# Patient Record
Sex: MALE | Race: WHITE | NOT HISPANIC OR LATINO | Employment: UNEMPLOYED | URBAN - METROPOLITAN AREA
[De-identification: names, ages, dates, MRNs, and addresses within clinical notes are randomized per-mention and may not be internally consistent; named-entity substitution may affect disease eponyms.]

---
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
## 2020-05-19 ENCOUNTER — APPOINTMENT (OUTPATIENT)
Dept: RADIOLOGY | Facility: MEDICAL CENTER | Age: 20
End: 2020-05-19
Attending: EMERGENCY MEDICINE
Payer: COMMERCIAL

## 2020-05-19 ENCOUNTER — HOSPITAL ENCOUNTER (EMERGENCY)
Facility: MEDICAL CENTER | Age: 20
End: 2020-05-19
Attending: EMERGENCY MEDICINE
Payer: COMMERCIAL

## 2020-05-19 VITALS
HEART RATE: 84 BPM | OXYGEN SATURATION: 98 % | SYSTOLIC BLOOD PRESSURE: 142 MMHG | RESPIRATION RATE: 16 BRPM | DIASTOLIC BLOOD PRESSURE: 78 MMHG | HEIGHT: 64 IN | WEIGHT: 117.5 LBS | BODY MASS INDEX: 20.06 KG/M2 | TEMPERATURE: 97 F

## 2020-05-19 DIAGNOSIS — R04.2 BLOOD IN SPUTUM: ICD-10-CM

## 2020-05-19 PROCEDURE — 99283 EMERGENCY DEPT VISIT LOW MDM: CPT

## 2020-05-19 PROCEDURE — 71046 X-RAY EXAM CHEST 2 VIEWS: CPT

## 2020-05-19 ASSESSMENT — FIBROSIS 4 INDEX: FIB4 SCORE: 0.32

## 2020-05-19 NOTE — ED NOTES
Reviewed DC instructions with pt. Pt verbalized understanding.Pt ambulatory with steady gait to lobby be transported home by mother. VSS on room air. All questions and concerns addressed with pt prior to DC.

## 2020-05-19 NOTE — ED TRIAGE NOTES
"Chief Complaint   Patient presents with   • Blood in Sputum     Pt states he woke up feeling mucus in the back of his throat and coughed up BRB. States he can taste the blood and is continually spitting.     BP (!) 167/104   Pulse (!) 109   Temp 36.6 °C (97.9 °F) (Temporal)   Resp 16   Ht 1.626 m (5' 4\")   Wt 53.3 kg (117 lb 8.1 oz)   SpO2 96%   BMI 20.17 kg/m²     Pt ambulated into triage, mask in place. Pt denies any other symptoms. Spit is clear in triage. VS as above, NAD, encouraged to return to the triage nurse or tech with any new complaints or symptoms.  "

## 2020-05-19 NOTE — ED PROVIDER NOTES
ED Provider Note    ED Provider Note    Primary care provider: Pcp Pt States None  Means of arrival: Walk-in  History obtained from: Patient    CHIEF COMPLAINT  Chief Complaint   Patient presents with   • Blood in Sputum     Pt states he woke up feeling mucus in the back of his throat and coughed up BRB. States he can taste the blood and is continually spitting.     Seen at 9:22 AM.   HPI  Compawing Dan is a 19 y.o. male who presents to the Emergency Department for spitting up blood.  The patient awoke this morning and spit up multiple bright red droplets of blood.  This has decreased in volume and now he notes a minor amount of brownish discharge when he spits.  He denies any recent nosebleed.  He denies any recent cough, he adamantly denies any hemoptysis.    He does note a rash that showed up on his right antecubital fossa.  He had some mild amount of nausea this morning.  Other than this he is without complaints.    He denies any headache, neck pain, epistaxis, cough, hemoptysis, chest pain, shortness of breath, lightheadedness, bleeding diathesis, impaired immunity, night sweats, weight loss.    REVIEW OF SYSTEMS  See HPI,   Remainder of ROS negative.     PAST MEDICAL HISTORY   has a past medical history of Allergy.    SURGICAL HISTORY  patient denies any surgical history    SOCIAL HISTORY  Social History     Tobacco Use   • Smoking status: Never Smoker   • Smokeless tobacco: Never Used   Substance Use Topics   • Alcohol use: No   • Drug use: No      Social History     Substance and Sexual Activity   Drug Use No       FAMILY HISTORY  Family History   Problem Relation Age of Onset   • Non-contributory Mother    • Thyroid Mother    • Non-contributory Father    • Heart Disease Father    • Other Father         hypercholesterolemia   • Hypertension Father    • Other Sister         migraine   • Cancer Maternal Grandfather         lung   • Heart Disease Maternal Grandfather    • Heart Disease Paternal  "Grandfather        CURRENT MEDICATIONS  Reviewed.  See Encounter Summary.     ALLERGIES  Allergies   Allergen Reactions   • Amoxicillin Hives       PHYSICAL EXAM  VITAL SIGNS: /78   Pulse 84   Temp 36.1 °C (97 °F) (Temporal)   Resp 16   Ht 1.626 m (5' 4\")   Wt 53.3 kg (117 lb 8.1 oz)   SpO2 98%   BMI 20.17 kg/m²   Constitutional: Awake, alert in no apparent distress.  HENT: Normocephalic, Bilateral external ears normal. Nose normal.  Bilateral nares and oropharynx within normal limits without any obvious source of bleeding.  Eyes: Conjunctiva normal, non-icteric, EOMI.    Thorax & Lungs: Easy unlabored respirations, Clear to ascultation bilaterally.  Cardiovascular: Regular rate, Regular rhythm, No murmurs, rubs or gallops.  Borderline tachycardic, heart rate returns to normal with rest.  Abdomen:  Soft, nontender, nondistended, normal active bowel sounds.   :    Skin: Visualized skin is  Dry, no petechiae, no ecchymosis, nonspecific mild redness in the right antecubital fossa.  Musculoskeletal:   No cyanosis, clubbing or edema.  Neurologic: Alert, Grossly non-focal.   Psychiatric: Normal affect, Normal mood  Lymphatic:  No cervical LAD      RADIOLOGY  DX-CHEST-2 VIEWS   Final Result      No acute cardiopulmonary findings.            COURSE & MEDICAL DECISION MAKING  Pertinent Labs & Imaging studies reviewed. (See chart for details)        9:22 AM - Medical record reviewed, patient seen and examined at bedside.    Decision Making:  This is a 19 y.o. year old male who presents with a chief complaint of spitting up blood.  He denies coughing or hemoptysis.  Chest x-ray was obtained to rule out an infiltrate, no signs of a mass or pneumonia.  The patient otherwise does not have any complaints, he has no physical findings or history of bleeding diathesis.  I do not feel that laboratory evaluation be beneficial given the short duration and resolving symptoms.    It seems very unlikely be pulmonary embolus, " the patient has excellent oxygenation, no risk factors for PE, not coughing up blood.  I suspect that this was a resolved epistaxis or some dry mucosal membranes in the posterior pharynx that had bleeding, subsequently resolved.    Discharge Medications:  New Prescriptions    No medications on file       The patient was discharged home (see d/c instructions) was told to return immediately for any signs or symptoms listed, or any worsening at all.  The patient verbally agreed to the discharge precautions and follow-up plan which is documented in EPIC.    The patient's blood pressure is elevated today. >120/80. I have referred them to primary care for follow up.       FINAL IMPRESSION  1. Blood in sputum

## 2020-05-27 ENCOUNTER — TELEMEDICINE (OUTPATIENT)
Dept: MEDICAL GROUP | Facility: MEDICAL CENTER | Age: 20
End: 2020-05-27
Payer: COMMERCIAL

## 2020-05-27 VITALS — BODY MASS INDEX: 19.97 KG/M2 | WEIGHT: 117 LBS | RESPIRATION RATE: 14 BRPM | HEIGHT: 64 IN

## 2020-05-27 DIAGNOSIS — Z76.89 ENCOUNTER TO ESTABLISH CARE: ICD-10-CM

## 2020-05-27 PROBLEM — L85.8 KERATOSIS PILARIS: Status: ACTIVE | Noted: 2020-05-27

## 2020-05-27 PROCEDURE — 99213 OFFICE O/P EST LOW 20 MIN: CPT | Performed by: NURSE PRACTITIONER

## 2020-05-27 ASSESSMENT — FIBROSIS 4 INDEX: FIB4 SCORE: 0.34

## 2020-05-27 NOTE — PROGRESS NOTES
Telemedicine Video Visit: Established Patient   This Remote Face to Face encounter was conducted via Zoom. Given the importance of social distancing and other strategies recommended to reduce the risk of COVID-19 transmission, I am providing medical care to this patient via audio/video visit in place of an in person visit at the request of the patient. Verbal consent to telehealth, risks, benefits, and consequences were discussed. Patient retains the right to withdraw at any time. All existing confidentiality protections apply. The patient has access to all transmitted medical information. No dissemination of any patient images or information to other entities without further written consent.  Subjective:     Chief Complaint   Patient presents with   • [facility] Care       Compa Dan is a 20 y.o. male presenting for [facility] care:    Presents to [facility] today.  Patient denies any personal medical history that is pertinent for any heart disease, lung disease, diabetes, thyroid or seizure disorder.  Denies any surgical history.  Family history includes father with history of having a CABG, HDL, HTN, mother with thyroid disorder, maternal grandfather with CAD and seizures, Paternal grandfather with CAD, half-sister with migraines, maternal grandmother with lung cancer. Denies family history of colon cancer.  He denies tobacco, illicit drug use, alcohol use.  Does not take any medications on a daily basis.  He does exercise routinely.  He reports that he had previously followed up with a dermatologist for possible rosacea on his face as well as for Keratosis Pilaris.      He currently denies any chest pain, shortness of breath, dizziness, heart palpitations or heart racing.  Denies ever being told that he does have a heart murmur.  Denies any numbness or tingling, difficulties with bowel or bladder.  Denies any migraines or chronic headaches.  Denies any weight loss or night sweats.    ROS   Denies any  "recent fevers or chills. No nausea or vomiting. No chest pains or shortness of breath.     Allergies   Allergen Reactions   • Amoxicillin Hives       Current medicines (including changes today)  No current outpatient medications on file.     No current facility-administered medications for this visit.        Patient Active Problem List    Diagnosis Date Noted   • Keratosis pilaris 05/27/2020       Family History   Problem Relation Age of Onset   • Non-contributory Mother    • Thyroid Mother    • Heart Disease Father         CABG   • Hypertension Father    • Hyperlipidemia Father    • Heart Disease Maternal Grandfather    • Seizures Maternal Grandfather    • Heart Disease Paternal Grandfather    • Cancer Maternal Grandmother         lung   • No Known Problems Half-sister    • Migraines Half-sister        He  has a past medical history of Allergy and Seasonal allergies. He also has no past medical history of Arthritis, ASTHMA, or Diabetes.  He  has no past surgical history on file.       Objective:   Vitals obtained by patient:  Resp 14   Ht 1.626 m (5' 4\")   Wt 53.1 kg (117 lb)   BMI 20.08 kg/m²     Physical Exam:  Constitutional: Alert, no distress, well-groomed.  Skin: No rashes in visible areas.  Eye: Round. Conjunctiva clear, lids normal. No icterus.   ENMT: Lips pink without lesions, good dentition, moist mucous membranes. Phonation normal.  Neck: No masses, no thyromegaly. Moves freely without pain.  CV: Pulse as reported by patient  Respiratory: Unlabored respiratory effort, no cough or audible wheeze  Psych: Alert and oriented x3, normal affect and mood.       Assessment and Plan:   The following treatment plan was discussed:     1. Encounter to establish care  Relatively healthy adult 20-year-old male.  At this time he does not have any concerns that need to be addressed.  Requesting records from previous provider.  Do recommend patient return for follow-up visit for in person visit and physical.  Have " updated chart.and reviewed recent ER visit with patient-no longer having symptoms.      Follow-up: Return if symptoms worsen or fail to improve, for Annual Preventative Exam.    Face to Face Video Visit:     KANG Wheeler.

## 2020-05-27 NOTE — LETTER
[facility]  CARMEN WheelerPDylonRDylonN.  [address] [facility] [address]  [place] [address]  Fax: [phone]   Authorization for Release/Disclosure of   Protected Health Information   Name: COMPA MCKINNON : 2000 SSN: [id number]   Address: [address] [place] [id number] Phone:    [phone] (home)    I authorize the entity listed below to release/disclose the PHI below to:   [facility]/Lyssa De La Torre A[facility]R[employer]RICHIE and GABY WheelerRSTEFANO   Provider or Entity Name:  [facility] (wellness center)   Address   City, State, [facility]   Phone:      Fax:     Reason for request: continuity of care   Information to be released:    [  ] LAST COLONOSCOPY,  including any PATH REPORT and follow-up  [  ] LAST FIT/COLOGUARD RESULT [  ] LAST DEXA  [  ] LAST MAMMOGRAM  [  ] LAST PAP  [  ] LAST LABS [  ] RETINA EXAM REPORT  [  ] IMMUNIZATION RECORDS  [ x ] Release all info      [  ] Check here and initial the line next to each item to release ALL health information INCLUDING  _____ Care and treatment for drug and / or alcohol abuse  _____ HIV testing, infection status, or AIDS  _____ Genetic Testing    DATES OF SERVICE OR TIME PERIOD TO BE DISCLOSED: _____________  I understand and acknowledge that:  * This Authorization may be revoked at any time by you in writing, except if your health information has already been used or disclosed.  * Your health information that will be used or disclosed as a result of you signing this authorization could be re-disclosed by the recipient. If this occurs, your re-disclosed health information may no longer be protected by State or Federal laws.  * You may refuse to sign this Authorization. Your refusal will not affect your ability to obtain treatment.  * This Authorization becomes effective upon signing and will  on (date) __________.      If no date is indicated, this Authorization will  one (1) year from the signature date.    Name: Compa  Danny Dan    Signature:   Date:     5/27/2020       PLEASE FAX REQUESTED RECORDS BACK TO: [phone]

## 2020-06-08 ENCOUNTER — TELEMEDICINE (OUTPATIENT)
Dept: MEDICAL GROUP | Facility: MEDICAL CENTER | Age: 20
End: 2020-06-08
Payer: COMMERCIAL

## 2020-06-08 VITALS
SYSTOLIC BLOOD PRESSURE: 125 MMHG | DIASTOLIC BLOOD PRESSURE: 80 MMHG | WEIGHT: 117 LBS | RESPIRATION RATE: 14 BRPM | HEIGHT: 64 IN | BODY MASS INDEX: 19.97 KG/M2

## 2020-06-08 DIAGNOSIS — F41.0 ANXIETY ATTACK: ICD-10-CM

## 2020-06-08 DIAGNOSIS — N50.812 TESTICULAR PAIN, LEFT: ICD-10-CM

## 2020-06-08 DIAGNOSIS — R04.0 EPISTAXIS: ICD-10-CM

## 2020-06-08 PROCEDURE — 99213 OFFICE O/P EST LOW 20 MIN: CPT | Mod: 95,CR | Performed by: NURSE PRACTITIONER

## 2020-06-08 ASSESSMENT — FIBROSIS 4 INDEX: FIB4 SCORE: 0.34

## 2020-06-08 NOTE — PROGRESS NOTES
"Telemedicine Video Visit: Established Patient   This Remote Face to Face encounter was conducted via Zoom. Given the importance of social distancing and other strategies recommended to reduce the risk of COVID-19 transmission, I am providing medical care to this patient via audio/video visit in place of an in person visit at the request of the patient. Verbal consent to telehealth, risks, benefits, and consequences were discussed. Patient retains the right to withdraw at any time. All existing confidentiality protections apply. The patient has access to all transmitted medical information. No dissemination of any patient images or information to other entities without further written consent.  Subjective:     Chief Complaint   Patient presents with   • Anxiety   • Testicle Pain   • Epistaxis       Compa Dan is a 20 y.o. male presenting for evaluation and management of:    1. Anxiety attack  Patient reports that about 3 to 4 days ago prior to falling asleep around 4 AM he started to feel \" weird\" and had a undescribable sensation over his body.  He got scared, was shaking, heart racing.  No headache, no sweating.  States it took about an hour for him to calm down with deep breathing watching TV.  Has not happened in the past.  Denies any precipitating factors.  He does have a disrupted sleep pattern.   States that he made himself throw up during all of this.    2. Epistaxis  Patient has been having bloody noses recently.  States he has had 3 in the past 2 months.  Denies any facial trauma.  He reports that this is not typical for him.  States is been on the left side all 3 times.  He feels as if his right side is more congested today.  Has been using nasal saline.  Does not use humidifier.  No headaches.  Denies any manual manipulation prior to bleeding episode.  Resolves after 10 minutes.    3. Testicular pain, left  Patient reports that he noticed a few days ago that when he woke up he had some pain in " "his left testicle.  \"Like something was pinching.\"  He describes it as radiating in his left groin and upper leg.  Denies any dysuria, hematuria, swelling in his scrotum, lumps, masses or discoloration.  No recent trauma to the area.  No excessive workouts that he could attribute the pain to.      ROS bloody nose, testicular pain, anxiety.    Denies any recent fevers or chills. No nausea or vomiting. No chest pains or shortness of breath.     Allergies   Allergen Reactions   • Amoxicillin Hives       Current medicines (including changes today)  No current outpatient medications on file.     No current facility-administered medications for this visit.        Patient Active Problem List    Diagnosis Date Noted   • Keratosis pilaris 05/27/2020       Family History   Problem Relation Age of Onset   • Non-contributory Mother    • Thyroid Mother    • Heart Disease Father         CABG   • Hypertension Father    • Hyperlipidemia Father    • Heart Disease Maternal Grandfather    • Seizures Maternal Grandfather    • Heart Disease Paternal Grandfather    • Cancer Maternal Grandmother         lung   • No Known Problems Half-sister    • Migraines Half-sister        He  has a past medical history of Allergy and Seasonal allergies. He also has no past medical history of Arthritis, ASTHMA, or Diabetes.  He  has no past surgical history on file.       Objective:   Vitals obtained by patient:  /80   Resp 14   Ht 1.626 m (5' 4\")   Wt 53.1 kg (117 lb)   BMI 20.08 kg/m²     Physical Exam:  Constitutional: Alert, no distress, well-groomed.  Skin: No rashes in visible areas.  Eye: Round. Conjunctiva clear, lids normal. No icterus.   ENMT: Lips pink without lesions, good dentition, moist mucous membranes. Phonation normal.  Neck: No masses, no thyromegaly. Moves freely without pain.  CV: Pulse as reported by patient  Respiratory: Unlabored respiratory effort, no cough or audible wheeze  Psych: Alert and oriented x3, normal affect " and mood.       Assessment and Plan:   The following treatment plan was discussed:     1. Anxiety attack  New for patient.  Has had a 1 episode of this. No precipitating factors that patient reports.  Have discussed ensuring adequate and restful sleep, avoid excessive caffeine, energy drinks.  Follow-up after labs.  - TSH WITH REFLEX TO FT4; Future  - CBC WITHOUT DIFFERENTIAL; Future   - Comp Metabolic Panel; Future    2. Epistaxis  Has had 3 episodes in the last 2 months.  Recommend stopping using saline, use humidifier at night, avoid manual irritation.  If symptoms do not improve to have discussed follow-up with ENT.    3. Testicular pain, left  Symptoms improving at this time.  No dysuria, skin changes, lumps or masses present in testicular area per patient, no swelling.  Suspect he has a muscle strain.  Have discussed emergent precautions.  Follow-up if symptoms persist or worsen.      Follow-up: Return if symptoms worsen or fail to improve.    Face to Face Video Visit:     KING Wheeler

## 2020-06-15 ENCOUNTER — HOSPITAL ENCOUNTER (OUTPATIENT)
Dept: LAB | Facility: MEDICAL CENTER | Age: 20
End: 2020-06-15
Attending: NURSE PRACTITIONER
Payer: COMMERCIAL

## 2020-06-15 DIAGNOSIS — F41.0 ANXIETY ATTACK: ICD-10-CM

## 2020-06-15 LAB
ALBUMIN SERPL BCP-MCNC: 4.8 G/DL (ref 3.2–4.9)
ALBUMIN/GLOB SERPL: 2.2 G/DL
ALP SERPL-CCNC: 94 U/L (ref 30–99)
ALT SERPL-CCNC: 21 U/L (ref 2–50)
ANION GAP SERPL CALC-SCNC: 8 MMOL/L (ref 7–16)
AST SERPL-CCNC: 20 U/L (ref 12–45)
BILIRUB SERPL-MCNC: 0.6 MG/DL (ref 0.1–1.5)
BUN SERPL-MCNC: 12 MG/DL (ref 8–22)
CALCIUM SERPL-MCNC: 9.8 MG/DL (ref 8.5–10.5)
CHLORIDE SERPL-SCNC: 101 MMOL/L (ref 96–112)
CO2 SERPL-SCNC: 28 MMOL/L (ref 20–33)
CREAT SERPL-MCNC: 0.86 MG/DL (ref 0.5–1.4)
ERYTHROCYTE [DISTWIDTH] IN BLOOD BY AUTOMATED COUNT: 38.5 FL (ref 35.9–50)
FASTING STATUS PATIENT QL REPORTED: NORMAL
GLOBULIN SER CALC-MCNC: 2.2 G/DL (ref 1.9–3.5)
GLUCOSE SERPL-MCNC: 94 MG/DL (ref 65–99)
HCT VFR BLD AUTO: 47.1 % (ref 42–52)
HGB BLD-MCNC: 16 G/DL (ref 14–18)
MCH RBC QN AUTO: 30.7 PG (ref 27–33)
MCHC RBC AUTO-ENTMCNC: 34 G/DL (ref 33.7–35.3)
MCV RBC AUTO: 90.4 FL (ref 81.4–97.8)
PLATELET # BLD AUTO: 252 K/UL (ref 164–446)
PMV BLD AUTO: 10.7 FL (ref 9–12.9)
POTASSIUM SERPL-SCNC: 4.1 MMOL/L (ref 3.6–5.5)
PROT SERPL-MCNC: 7 G/DL (ref 6–8.2)
RBC # BLD AUTO: 5.21 M/UL (ref 4.7–6.1)
SODIUM SERPL-SCNC: 137 MMOL/L (ref 135–145)
WBC # BLD AUTO: 5.2 K/UL (ref 4.8–10.8)

## 2020-06-15 PROCEDURE — 84443 ASSAY THYROID STIM HORMONE: CPT

## 2020-06-15 PROCEDURE — 80053 COMPREHEN METABOLIC PANEL: CPT

## 2020-06-15 PROCEDURE — 36415 COLL VENOUS BLD VENIPUNCTURE: CPT

## 2020-06-15 PROCEDURE — 85027 COMPLETE CBC AUTOMATED: CPT

## 2020-06-16 LAB — TSH SERPL DL<=0.005 MIU/L-ACNC: 4.23 UIU/ML (ref 0.38–5.33)

## 2020-06-25 DIAGNOSIS — R79.89 ABNORMAL THYROID BLOOD TEST: ICD-10-CM

## 2020-07-28 ENCOUNTER — NURSE TRIAGE (OUTPATIENT)
Dept: HEALTH INFORMATION MANAGEMENT | Facility: OTHER | Age: 20
End: 2020-07-28

## 2020-07-28 NOTE — TELEPHONE ENCOUNTER
Regarding: DECLINED UC + V.V.   ----- Message from Amanda Merlino sent at 7/28/2020 11:55 AM PDT -----  S.O.B/ BODY ACHES/ WEIRD SENSATION WHEN URINATIONG/ ANXIETY CONCERNS

## 2020-07-28 NOTE — TELEPHONE ENCOUNTER
"  Reason for Disposition  • Unable to urinate (or only a few drops) and bladder feels very full    Additional Information  • Negative: Shock suspected (e.g., cold/pale/clammy skin, too weak to stand, low BP, rapid pulse)  • Negative: Sounds like a life-threatening emergency to the triager    Answer Assessment - Initial Assessment Questions  1. SEVERITY: \"How bad is the pain?\"  (e.g., Scale 1-10; mild, moderate, or severe)    - MILD (1-3): complains slightly about urination hurting    - MODERATE (4-7): interferes with normal activities      - SEVERE (8-10): excruciating, unwilling or unable to urinate because of the pain       moderate  2. FREQUENCY: \"How many times have you had painful urination today?\"       Infrequent   3. PATTERN: \"Is pain present every time you urinate or just sometimes?\"       sometimes  4. ONSET: \"When did the painful urination start?\"       2 weeks ago  5. FEVER: \"Do you have a fever?\" If so, ask: \"What is your temperature, how was it measured, and when did it start?\"      no  6. PAST UTI: \"Have you had a urine infection before?\" If so, ask: \"When was the last time?\" and \"What happened that time?\"       no  7. CAUSE: \"What do you think is causing the painful urination?\"       Not sure  8. OTHER SYMPTOMS: \"Do you have any other symptoms?\" (e.g., flank pain, penile discharge, scrotal pain, blood in urine)      No discharge    Protocols used: URINATION PAIN - MALE-A-OH    "

## 2020-07-29 ENCOUNTER — OFFICE VISIT (OUTPATIENT)
Dept: URGENT CARE | Facility: PHYSICIAN GROUP | Age: 20
End: 2020-07-29
Payer: COMMERCIAL

## 2020-07-29 VITALS
RESPIRATION RATE: 18 BRPM | HEART RATE: 74 BPM | BODY MASS INDEX: 19.87 KG/M2 | OXYGEN SATURATION: 96 % | DIASTOLIC BLOOD PRESSURE: 60 MMHG | WEIGHT: 116.4 LBS | TEMPERATURE: 97.6 F | HEIGHT: 64 IN | SYSTOLIC BLOOD PRESSURE: 98 MMHG

## 2020-07-29 DIAGNOSIS — R30.0 DYSURIA: ICD-10-CM

## 2020-07-29 DIAGNOSIS — N50.819 TESTICULAR PAIN: ICD-10-CM

## 2020-07-29 LAB
APPEARANCE UR: CLEAR
BILIRUB UR STRIP-MCNC: NORMAL MG/DL
COLOR UR AUTO: YELLOW
GLUCOSE UR STRIP.AUTO-MCNC: NORMAL MG/DL
KETONES UR STRIP.AUTO-MCNC: NORMAL MG/DL
LEUKOCYTE ESTERASE UR QL STRIP.AUTO: NORMAL
NITRITE UR QL STRIP.AUTO: NORMAL
PH UR STRIP.AUTO: 5.5 [PH] (ref 5–8)
PROT UR QL STRIP: NORMAL MG/DL
RBC UR QL AUTO: NORMAL
SP GR UR STRIP.AUTO: 1.03
UROBILINOGEN UR STRIP-MCNC: 0.2 MG/DL

## 2020-07-29 PROCEDURE — 81002 URINALYSIS NONAUTO W/O SCOPE: CPT | Performed by: NURSE PRACTITIONER

## 2020-07-29 PROCEDURE — 99214 OFFICE O/P EST MOD 30 MIN: CPT | Performed by: NURSE PRACTITIONER

## 2020-07-29 ASSESSMENT — FIBROSIS 4 INDEX: FIB4 SCORE: 0.35

## 2020-07-29 NOTE — TELEPHONE ENCOUNTER
I do agree with the plan of following up with urgent care as this could potentially be acute cystitis, STI infection, urinary obstruction, and would be concerned with pyelonephritis, hydronephrosis or worsening symptoms.

## 2020-07-30 ENCOUNTER — HOSPITAL ENCOUNTER (OUTPATIENT)
Dept: RADIOLOGY | Facility: MEDICAL CENTER | Age: 20
End: 2020-07-30
Attending: NURSE PRACTITIONER
Payer: COMMERCIAL

## 2020-07-30 DIAGNOSIS — R30.0 DYSURIA: ICD-10-CM

## 2020-07-30 PROCEDURE — 76870 US EXAM SCROTUM: CPT

## 2020-07-30 NOTE — PROGRESS NOTES
"Subjective:      Compa Dan is a 20 y.o. male who presents with Other (delayed urination, sore groin area, x2 weeks )        Reviewed past medical, surgical and family history. Reviewed prescription and OTC medications with patient in electronic health record today  Allergies: Amoxicillin            HPI.  This is a new problem.  Compa is a 20-year-old male who presents with pain in his bilateral groins and testicular area x2 weeks.  He has never been sexually active.  He does masturbate.  He has no pain with ejaculation.  He reports mild burning with urination.  He has never had anything like this.  He has not noted any swelling.  No other aggravating relieving factors.  He reports his pain as 5/10 \"if I am thinking about it\".  He is able to sleep well and is now awakened with pain.  No other aggravating relieving factors.    ROS     Denies fever, chills, change in  .  No urinary frequency or urgency.  Denies body aches or back pain.          Objective:     BP (!) 98/60 (BP Location: Left arm, Patient Position: Sitting, BP Cuff Size: Adult)   Pulse 74   Temp 36.4 °C (97.6 °F) (Temporal)   Resp 18   Ht 1.626 m (5' 4\")   Wt 52.8 kg (116 lb 6.4 oz)   SpO2 96%   BMI 19.98 kg/m²      Physical Exam  Vitals signs and nursing note reviewed.   Constitutional:       General: He is not in acute distress.     Appearance: Normal appearance. He is normal weight. He is not ill-appearing.   HENT:      Head: Normocephalic.   Cardiovascular:      Pulses: Normal pulses.      Heart sounds: Normal heart sounds.   Pulmonary:      Effort: Pulmonary effort is normal.      Breath sounds: Normal breath sounds.   Genitourinary:     Penis: Uncircumcised.       Comments: Exam limited by pt's extreme tenderness to even the lightest touch and holding his hand to prevent examination. He reports that there is too much pain to even touch his testicle.   Lymphadenopathy:      Lower Body: No right inguinal adenopathy. No left " inguinal adenopathy.   Neurological:      Mental Status: He is alert and oriented to person, place, and time.   Psychiatric:         Mood and Affect: Mood normal.         Behavior: Behavior normal.         Thought Content: Thought content normal.             Results for orders placed or performed in visit on 07/29/20   POCT Urinalysis   Result Value Ref Range    POC Color yellow Negative    POC Appearance clear Negative    POC Leukocyte Esterase neg Negative    POC Nitrites neg Negative    POC Urobiligen 0.2 Negative (0.2) mg/dL    POC Protein neg Negative mg/dL    POC Urine PH 5.5 5.0 - 8.0    POC Blood neg Negative    POC Specific Gravity 1.030 <1.005 - >1.030    POC Ketones neg Negative mg/dL    POC Bilirubin neg Negative mg/dL    POC Glucose neg Negative mg/dL            Assessment/Plan:       1. Testicular pain     2. Dysuria  POCT Urinalysis    [id number]         US: pending scheduled tomorrow.   ER precautions given to pt. If pain or symptoms worsen - go to ER.   OTC  analgesic of choice. Follow manufactures dosing and safety precautions.     Keep well hydrated    Further treatment plan to be determined after US report is available.

## 2020-11-10 ENCOUNTER — APPOINTMENT (RX ONLY)
Dept: URBAN - METROPOLITAN AREA CLINIC 22 | Facility: CLINIC | Age: 20
Setting detail: DERMATOLOGY
End: 2020-11-10

## 2020-11-10 DIAGNOSIS — D18.0 HEMANGIOMA: ICD-10-CM

## 2020-11-10 DIAGNOSIS — Q826 OTHER SPECIFIED ANOMALIES OF SKIN: ICD-10-CM

## 2020-11-10 DIAGNOSIS — L81.4 OTHER MELANIN HYPERPIGMENTATION: ICD-10-CM

## 2020-11-10 DIAGNOSIS — L90.0 LICHEN SCLEROSUS ET ATROPHICUS: ICD-10-CM

## 2020-11-10 DIAGNOSIS — L82.1 OTHER SEBORRHEIC KERATOSIS: ICD-10-CM

## 2020-11-10 DIAGNOSIS — D485 NEOPLASM OF UNCERTAIN BEHAVIOR OF SKIN: ICD-10-CM

## 2020-11-10 DIAGNOSIS — D22 MELANOCYTIC NEVI: ICD-10-CM

## 2020-11-10 DIAGNOSIS — Q819 OTHER SPECIFIED ANOMALIES OF SKIN: ICD-10-CM

## 2020-11-10 DIAGNOSIS — Q828 OTHER SPECIFIED ANOMALIES OF SKIN: ICD-10-CM

## 2020-11-10 PROBLEM — D22.9 MELANOCYTIC NEVI, UNSPECIFIED: Status: ACTIVE | Noted: 2020-11-10

## 2020-11-10 PROBLEM — D18.01 HEMANGIOMA OF SKIN AND SUBCUTANEOUS TISSUE: Status: ACTIVE | Noted: 2020-11-10

## 2020-11-10 PROBLEM — D48.5 NEOPLASM OF UNCERTAIN BEHAVIOR OF SKIN: Status: ACTIVE | Noted: 2020-11-10

## 2020-11-10 PROBLEM — Q82.8 OTHER SPECIFIED CONGENITAL MALFORMATIONS OF SKIN: Status: ACTIVE | Noted: 2020-11-10

## 2020-11-10 PROCEDURE — ? BIOPSY BY SHAVE METHOD

## 2020-11-10 PROCEDURE — ? PRESCRIPTION

## 2020-11-10 PROCEDURE — ? ADDITIONAL NOTES

## 2020-11-10 PROCEDURE — 99214 OFFICE O/P EST MOD 30 MIN: CPT | Mod: 25

## 2020-11-10 PROCEDURE — 11102 TANGNTL BX SKIN SINGLE LES: CPT

## 2020-11-10 PROCEDURE — ? COUNSELING

## 2020-11-10 RX ADMIN — TRIAMCINOLONE ACETONIDE 1: 1 CREAM TOPICAL at 00:00

## 2020-11-10 ASSESSMENT — LOCATION ZONE DERM
LOCATION ZONE: ARM
LOCATION ZONE: SCROTUM
LOCATION ZONE: SCALP

## 2020-11-10 ASSESSMENT — LOCATION SIMPLE DESCRIPTION DERM
LOCATION SIMPLE: RIGHT SCROTUM
LOCATION SIMPLE: ANTERIOR SCALP
LOCATION SIMPLE: LEFT FOREARM
LOCATION SIMPLE: LEFT SCROTUM

## 2020-11-10 ASSESSMENT — LOCATION DETAILED DESCRIPTION DERM
LOCATION DETAILED: MID-FRONTAL SCALP
LOCATION DETAILED: LEFT PROXIMAL DORSAL FOREARM
LOCATION DETAILED: LEFT SUPERIOR MEDIAL SCROTUM
LOCATION DETAILED: RIGHT SUPERIOR MEDIAL SCROTUM

## 2020-11-10 NOTE — PROCEDURE: BIOPSY BY SHAVE METHOD
Detail Level: Detailed
Depth Of Biopsy: dermis
Was A Bandage Applied: Yes
Size Of Lesion In Cm: 0.8
X Size Of Lesion In Cm: 0.7
Biopsy Type: H and E
Biopsy Method: Personna blade
Anesthesia Type: 1% lidocaine with epinephrine and a 1:10 solution of 8.4% sodium bicarbonate
Anesthesia Volume In Cc: 1
Additional Anesthesia Volume In Cc (Will Not Render If 0): 0
Hemostasis: Aluminum Chloride
Wound Care: Petrolatum
Dressing: Band-Aid
Destruction After The Procedure: No
Type Of Destruction Used: Curettage
Curettage Text: The wound bed was treated with curettage after the biopsy was performed.
Cryotherapy Text: The wound bed was treated with cryotherapy after the biopsy was performed.
Electrodesiccation Text: The wound bed was treated with electrodesiccation after the biopsy was performed.
Electrodesiccation And Curettage Text: The wound bed was treated with electrodesiccation and curettage after the biopsy was performed.
Silver Nitrate Text: The wound bed was treated with silver nitrate after the biopsy was performed.
Lab: [id number]
Lab Facility: 
Consent: Written consent was obtained and risks were reviewed including but not limited to scarring, infection, bleeding, scabbing, incomplete removal, nerve damage and allergy to anesthesia.
Post-Care Instructions: I reviewed with the patient in detail post-care instructions. Patient is to keep the biopsy site dry overnight, and then apply bacitracin twice daily until healed. Patient may apply hydrogen peroxide soaks to remove any crusting.
Notification Instructions: Patient will be notified of biopsy results. However, patient instructed to call the office if not contacted within 2 weeks.
Billing Type: Third-Party Bill
Information: Selecting Yes will display possible errors in your note based on the variables you have selected. This validation is only offered as a suggestion for you. PLEASE NOTE THAT THE VALIDATION TEXT WILL BE REMOVED WHEN YOU FINALIZE YOUR NOTE. IF YOU WANT TO FAX A PRELIMINARY NOTE YOU WILL NEED TO TOGGLE THIS TO 'NO' IF YOU DO NOT WANT IT IN YOUR FAXED NOTE.

## 2020-11-11 RX ORDER — TRIAMCINOLONE ACETONIDE 1 MG/G
CREAM TOPICAL
Qty: 1 | Refills: 0 | Status: ERX | COMMUNITY
Start: 2020-11-10

## 2020-11-16 ENCOUNTER — OFFICE VISIT (OUTPATIENT)
Dept: URGENT CARE | Facility: PHYSICIAN GROUP | Age: 20
End: 2020-11-16
Payer: COMMERCIAL

## 2020-11-16 VITALS
HEIGHT: 64 IN | WEIGHT: 122.6 LBS | TEMPERATURE: 98.1 F | HEART RATE: 80 BPM | DIASTOLIC BLOOD PRESSURE: 94 MMHG | BODY MASS INDEX: 20.93 KG/M2 | SYSTOLIC BLOOD PRESSURE: 120 MMHG | OXYGEN SATURATION: 97 %

## 2020-11-16 DIAGNOSIS — J02.9 PHARYNGITIS, UNSPECIFIED ETIOLOGY: ICD-10-CM

## 2020-11-16 LAB
INT CON NEG: NORMAL
INT CON POS: NORMAL
S PYO AG THROAT QL: NEGATIVE

## 2020-11-16 PROCEDURE — 87880 STREP A ASSAY W/OPTIC: CPT | Performed by: PHYSICIAN ASSISTANT

## 2020-11-16 PROCEDURE — 99214 OFFICE O/P EST MOD 30 MIN: CPT | Performed by: PHYSICIAN ASSISTANT

## 2020-11-16 RX ORDER — AZITHROMYCIN 250 MG/1
TABLET, FILM COATED ORAL
Qty: 6 TAB | Refills: 0 | Status: SHIPPED | OUTPATIENT
Start: 2020-11-16 | End: 2021-07-24

## 2020-11-16 ASSESSMENT — ENCOUNTER SYMPTOMS
SWOLLEN GLANDS: 1
DIAPHORESIS: 0
COUGH: 0
DIARRHEA: 0
VOMITING: 0
EYE DISCHARGE: 0
NAUSEA: 0
DIZZINESS: 0
HEADACHES: 1
SORE THROAT: 1
SENSORY CHANGE: 0
NECK PAIN: 0
STRIDOR: 0
ABDOMINAL PAIN: 0
MYALGIAS: 0
PALPITATIONS: 0
SHORTNESS OF BREATH: 0
EYE REDNESS: 0
CHILLS: 0
TROUBLE SWALLOWING: 0
FEVER: 1
TINGLING: 0

## 2020-11-16 ASSESSMENT — FIBROSIS 4 INDEX: FIB4 SCORE: 0.35

## 2020-11-16 NOTE — PROGRESS NOTES
Subjective:   Compa Dan is a 20 y.o. male who presents for Pharyngitis (sore throat, x1 day, headache)      Pharyngitis   This is a new problem. The current episode started yesterday. The problem has been gradually worsening. Neither side of throat is experiencing more pain than the other. Maximum temperature: Patient felt warm but did not take his temperature. The pain is at a severity of 8/10. Associated symptoms include headaches and swollen glands. Pertinent negatives include no abdominal pain, congestion, coughing, diarrhea, ear pain, neck pain, shortness of breath, stridor, trouble swallowing or vomiting. He has had no exposure to strep or mono. He has tried NSAIDs for the symptoms. The treatment provided mild relief.       Review of Systems   Constitutional: Positive for fever (Felt warm did not take a temperature.) and malaise/fatigue. Negative for chills and diaphoresis.   HENT: Positive for sore throat. Negative for congestion, ear pain and trouble swallowing.    Eyes: Negative for discharge and redness.   Respiratory: Negative for cough, shortness of breath and stridor.    Cardiovascular: Negative for chest pain and palpitations.   Gastrointestinal: Negative for abdominal pain, diarrhea, nausea and vomiting.   Musculoskeletal: Negative for myalgias and neck pain.   Skin: Negative for rash.   Neurological: Positive for headaches. Negative for dizziness, tingling and sensory change.       Medications:    • This patient does not have an active medication from one of the medication groupers.    Allergies: Amoxicillin    Problem List: Compa Dan has Keratosis pilaris on their problem list.    Surgical History:  No past surgical history on file.    Past Social Hx: Compa Dan  reports that he has never smoked. He has never used smokeless tobacco. He reports that he does not drink alcohol or use drugs.     Past Family Hx:  Compa Dan family history includes Cancer in his  "maternal grandmother; Heart Disease in his father, maternal grandfather, and paternal grandfather; Hyperlipidemia in his father; Hypertension in his father; Migraines in his half-sister; No Known Problems in his half-sister; Non-contributory in his mother; Seizures in his maternal grandfather; Thyroid in his mother.     Problem list, medications, and allergies reviewed by myself today in Epic.     Objective:     /94   Pulse 80   Temp 36.7 °C (98.1 °F)   Ht 1.626 m (5' 4\")   Wt 55.6 kg (122 lb 9.6 oz)   SpO2 97%   BMI 21.04 kg/m²     Physical Exam  Constitutional:       General: He is not in acute distress.     Appearance: Normal appearance. He is not ill-appearing, toxic-appearing or diaphoretic.   HENT:      Head: Normocephalic and atraumatic.      Right Ear: Tympanic membrane, ear canal and external ear normal.      Left Ear: Tympanic membrane, ear canal and external ear normal.      Nose: Nose normal. No congestion or rhinorrhea.      Mouth/Throat:      Mouth: Mucous membranes are moist.      Pharynx: Posterior oropharyngeal erythema present. No oropharyngeal exudate.      Comments: Posterior oropharynx erythematous.  Tonsillectomy.  Uvula midline nondeviated.  No peritonsillar abscess.  No tenderness to palpation along the pharynx.  Eyes:      Conjunctiva/sclera: Conjunctivae normal.   Neck:      Musculoskeletal: Normal range of motion. No neck rigidity or muscular tenderness.   Cardiovascular:      Rate and Rhythm: Normal rate and regular rhythm.      Pulses: Normal pulses.      Heart sounds: Normal heart sounds.   Pulmonary:      Effort: Pulmonary effort is normal.      Breath sounds: Normal breath sounds. No wheezing, rhonchi or rales.   Abdominal:      Palpations: Abdomen is soft.   Lymphadenopathy:      Cervical: Cervical adenopathy (Anterior cervical adenopathy) present.   Skin:     General: Skin is warm and dry.      Capillary Refill: Capillary refill takes less than 2 seconds.   Neurological: "      General: No focal deficit present.      Mental Status: He is alert. Mental status is at baseline.   Psychiatric:         Mood and Affect: Mood normal.         Thought Content: Thought content normal.     Rapid strep: Neg      Assessment/Plan:     Diagnosis and associated orders:   1. Pharyngitis, unspecified etiology    - POCT Rapid Strep A  - azithromycin (ZITHROMAX) 250 MG Tab; Take 2 tablets PO on day one, then 1 tablet PO on day two to five.  Dispense: 6 Tab; Refill: 0       Comments/MDM:   Take antibiotic as directed.  3 Centor criteria in clinic.  Shared decision-making was used to start the patient on empiric antibiotic therapy.  Oral Hydration.  Warm salt water gargles.  OTC Throat lozenges or spray (Cepacol).  Tylenol and Motrin as directed for pain and fever.  Hand Hygiene: Wash hands frequently with soap and water.  Throw away toothbrush after 24 hrs on antibiotics, replace with new one.    Follow up for persistent throat pain, increased swelling, persistent fevers, difficulty swallowing, shortness of breath, weakness, elevated heart rate, or any other concerns.            Differential diagnosis, natural history, supportive care, and indications for immediate follow-up discussed.    Advised the patient to follow-up with the primary care physician for recheck, reevaluation, and consideration of further management.    Please note that this dictation was created using voice recognition software. I have made reasonable attempt to correct obvious errors, but I expect that there are errors of grammar and possibly content that I did not discover before finalizing the note.    This note was electronically signed by MEGAN Bowers PA-C

## 2021-07-24 ENCOUNTER — OFFICE VISIT (OUTPATIENT)
Dept: URGENT CARE | Facility: PHYSICIAN GROUP | Age: 21
End: 2021-07-24
Payer: COMMERCIAL

## 2021-07-24 VITALS
RESPIRATION RATE: 16 BRPM | SYSTOLIC BLOOD PRESSURE: 104 MMHG | TEMPERATURE: 97.8 F | WEIGHT: 120 LBS | HEART RATE: 90 BPM | BODY MASS INDEX: 20.49 KG/M2 | OXYGEN SATURATION: 97 % | DIASTOLIC BLOOD PRESSURE: 60 MMHG | HEIGHT: 64 IN

## 2021-07-24 DIAGNOSIS — S61.012A LACERATION OF LEFT THUMB WITHOUT FOREIGN BODY WITHOUT DAMAGE TO NAIL, INITIAL ENCOUNTER: ICD-10-CM

## 2021-07-24 PROCEDURE — 12001 RPR S/N/AX/GEN/TRNK 2.5CM/<: CPT | Performed by: NURSE PRACTITIONER

## 2021-07-24 ASSESSMENT — FIBROSIS 4 INDEX: FIB4 SCORE: 0.36

## 2021-07-24 NOTE — PROGRESS NOTES
Chief Complaint   Patient presents with   • Laceration     x1 day, cut finger with knife        HISTORY OF PRESENT ILLNESS: Patient is a pleasant 21 y.o. male who presents to urgent care today with concerns of laceration.  The patient notes that he was cutting a box with his pocket knife earlier today when he accidentally sliced his left thumb.  He immediately washed the wound out and came to urgent care for evaluation.  Pain is moderate.  Denies any numbness or tingling.  Vaccinations are up-to-date.  Denies other areas of injury or trauma.  He is right-hand dominant.    Patient Active Problem List    Diagnosis Date Noted   • Keratosis pilaris 2020       Allergies:Amoxicillin    No current outpatient medications on file prior to visit.     No current facility-administered medications on file prior to visit.         Past Medical History:   Diagnosis Date   • Allergy    • Seasonal allergies        Social History     Tobacco Use   • Smoking status: Never Smoker   • Smokeless tobacco: Never Used   Vaping Use   • Vaping Use: Never used   Substance Use Topics   • Alcohol use: No   • Drug use: No       Family Status   Relation Name Status   • Mo  Alive   • Fa  Alive   • MGFa  Alive   • PGFa  (Not Specified)   • MGMo     • hsis  Alive   • hsis  Alive     Family History   Problem Relation Age of Onset   • Non-contributory Mother    • Thyroid Mother    • Heart Disease Father         CABG   • Hypertension Father    • Hyperlipidemia Father    • Heart Disease Maternal Grandfather    • Seizures Maternal Grandfather    • Heart Disease Paternal Grandfather    • Cancer Maternal Grandmother         lung   • No Known Problems Half-sister    • Migraines Half-sister        ROS:  Review of Systems   Constitutional: Negative for fever, chills, weight loss, malaise, and fatigue.   HENT: Negative for ear pain, nosebleeds, congestion, sore throat and neck pain.    Eyes: Negative for vision changes.   Neuro: Negative for  "headache, sensory changes, weakness, seizure, LOC.   Cardiovascular: Negative for chest pain, palpitations, orthopnea and leg swelling.   Respiratory: Negative for cough, sputum production, shortness of breath and wheezing.   Gastrointestinal: Negative for abdominal pain, nausea, vomiting or diarrhea.   Genitourinary: Negative for dysuria, urgency and frequency.  Musculoskeletal: Negative for falls, neck pain, back pain, joint pain, myalgias.   Skin: Positive for laceration.  Negative for rash, diaphoresis.     Exam:  /60   Pulse 90   Temp 36.6 °C (97.8 °F) (Temporal)   Resp 16   Ht 1.626 m (5' 4\")   Wt 54.4 kg (120 lb)   SpO2 97%   General: well-nourished, well-developed male in NAD  Head: normocephalic, atraumatic  Eyes: PERRLA, no conjunctival injection, acuity grossly intact, lids normal.  Ears: normal shape and symmetry, no tenderness, no discharge. External canals are without any significant edema or erythema. Gross auditory acuity is intact.  Nose: symmetrical without tenderness, no discharge.  Mouth/Throat: reasonable hygiene, no erythema, exudates or tonsillar enlargement.  Neck: no masses, range of motion within normal limits, no tracheal deviation. No obvious thyroid enlargement.   Lymph: no cervical adenopathy. No supraclavicular adenopathy.   Neuro: alert and oriented. Cranial nerves 1-12 grossly intact. No sensory deficit.   Cardiovascular: regular rate and rhythm. No edema.  Pulmonary: no distress. Chest is symmetrical with respiration, no wheezes, crackles, or rhonchi.   Musculoskeletal: no clubbing, appropriate muscle tone, gait is stable.  There is a laceration to left thumb, no bony involvement.  Skin: warm, no clubbing, no cyanosis, no rashes.  There is a superficial 1.5 cm laceration to lateral aspect and distal aspect of left thumb, bleeding controlled, no foreign bodies, neurovascular is intact, cap refill is brisk.  Psych: appropriate mood, affect, judgement.       Risk and " benefits of laceration repair discussed with patient, including bleeding, infection, scarring and reaction. Consent was obtained for repair of laceration. Under clean conditions I applied 3 thin layers of skin adhesive (dermabond) with dry time in between with good wound edge approximation. Patient tolerated procedure well. Dressing was applied and wound care/follow up instructions were given. Patients questions were answered.        Assessment/Plan:  1. Laceration of left thumb without foreign body without damage to nail, initial encounter         Patient presents with superficial laceration to left thumb, wound repaired in clinic with Dermabond, patient tolerated well.  Wound care discussed with patient.  Supportive care, differential diagnoses, and indications for immediate follow-up discussed with patient.   Pathogenesis of diagnosis discussed including typical length and natural progression.   Instructed to return to clinic or nearest emergency department for any change in condition, further concerns, or worsening of symptoms.  Patient states understanding of the plan of care and discharge instructions.      Please note that this dictation was created using voice recognition software. I have made every reasonable attempt to correct obvious errors, but I expect that there are errors of grammar and possibly content that I did not discover before finalizing the note.      KANG Mcdonough.

## 2021-11-17 ENCOUNTER — APPOINTMENT (RX ONLY)
Dept: URBAN - METROPOLITAN AREA CLINIC 22 | Facility: CLINIC | Age: 21
Setting detail: DERMATOLOGY
End: 2021-11-17

## 2021-11-17 DIAGNOSIS — Q828 OTHER SPECIFIED ANOMALIES OF SKIN: ICD-10-CM

## 2021-11-17 DIAGNOSIS — Q826 OTHER SPECIFIED ANOMALIES OF SKIN: ICD-10-CM

## 2021-11-17 DIAGNOSIS — D22 MELANOCYTIC NEVI: ICD-10-CM

## 2021-11-17 DIAGNOSIS — Q85.1 TUBEROUS SCLEROSIS: ICD-10-CM

## 2021-11-17 DIAGNOSIS — Z71.89 OTHER SPECIFIED COUNSELING: ICD-10-CM

## 2021-11-17 DIAGNOSIS — L21.8 OTHER SEBORRHEIC DERMATITIS: ICD-10-CM

## 2021-11-17 DIAGNOSIS — Q819 OTHER SPECIFIED ANOMALIES OF SKIN: ICD-10-CM

## 2021-11-17 PROBLEM — Q82.8 OTHER SPECIFIED CONGENITAL MALFORMATIONS OF SKIN: Status: ACTIVE | Noted: 2021-11-17

## 2021-11-17 PROBLEM — D22.5 MELANOCYTIC NEVI OF TRUNK: Status: ACTIVE | Noted: 2021-11-17

## 2021-11-17 PROCEDURE — ? SUNSCREEN TREATMENT REGIMEN

## 2021-11-17 PROCEDURE — 99213 OFFICE O/P EST LOW 20 MIN: CPT

## 2021-11-17 PROCEDURE — ? COUNSELING

## 2021-11-17 PROCEDURE — ? DEFER

## 2021-11-17 ASSESSMENT — LOCATION SIMPLE DESCRIPTION DERM
LOCATION SIMPLE: RIGHT UPPER ARM
LOCATION SIMPLE: LEFT FOREARM
LOCATION SIMPLE: ABDOMEN
LOCATION SIMPLE: LEFT UPPER ARM
LOCATION SIMPLE: RIGHT UPPER BACK
LOCATION SIMPLE: UPPER BACK
LOCATION SIMPLE: LEFT SCALP
LOCATION SIMPLE: RIGHT FOREARM

## 2021-11-17 ASSESSMENT — LOCATION DETAILED DESCRIPTION DERM
LOCATION DETAILED: EPIGASTRIC SKIN
LOCATION DETAILED: RIGHT ANTERIOR PROXIMAL UPPER ARM
LOCATION DETAILED: LEFT MEDIAL FRONTAL SCALP
LOCATION DETAILED: LEFT ANTERIOR PROXIMAL UPPER ARM
LOCATION DETAILED: RIGHT PROXIMAL DORSAL FOREARM
LOCATION DETAILED: SUPERIOR THORACIC SPINE
LOCATION DETAILED: RIGHT SUPERIOR UPPER BACK
LOCATION DETAILED: LEFT DISTAL DORSAL FOREARM

## 2021-11-17 ASSESSMENT — LOCATION ZONE DERM
LOCATION ZONE: TRUNK
LOCATION ZONE: SCALP
LOCATION ZONE: ARM